# Patient Record
Sex: MALE | Race: BLACK OR AFRICAN AMERICAN | NOT HISPANIC OR LATINO | URBAN - METROPOLITAN AREA
[De-identification: names, ages, dates, MRNs, and addresses within clinical notes are randomized per-mention and may not be internally consistent; named-entity substitution may affect disease eponyms.]

---
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
## 2023-04-04 NOTE — ED PEDIATRIC NURSE NOTE - LATERALITY
right
FAMILY HISTORY:  Family history of atrial fibrillation, father  Family history of colon cancer, father  FH: HTN (hypertension), father, sisters  FH: migraines, sisters    Sibling  Still living? Unknown  Family history of asthma, Age at diagnosis: Age Unknown
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
## 2024-08-16 NOTE — ED PEDIATRIC NURSE NOTE - PLAN OF CARE
August 16, 2024     Patient: Chyna Jaramillo   YOB: 1997   Date of Visit: 8/16/2024       To Whom It May Concern:    It is my medical opinion that Chyna Jaramillo may return to full duty immediately with no restrictions on Monday August 19, 2024..    If you have any questions or concerns, please don't hesitate to call.         Sincerely,        Will B Beucler, DO    CC: No Recipients   Explanation of exam/test/Fall precautions/Bedside visitors/Position of comfort